# Patient Record
Sex: FEMALE | HISPANIC OR LATINO | URBAN - METROPOLITAN AREA
[De-identification: names, ages, dates, MRNs, and addresses within clinical notes are randomized per-mention and may not be internally consistent; named-entity substitution may affect disease eponyms.]

---

## 2021-03-29 ENCOUNTER — OFFICE VISIT (OUTPATIENT)
Dept: URBAN - METROPOLITAN AREA CLINIC 109 | Facility: CLINIC | Age: 41
End: 2021-03-29
Payer: COMMERCIAL

## 2021-03-29 ENCOUNTER — WEB ENCOUNTER (OUTPATIENT)
Dept: URBAN - METROPOLITAN AREA CLINIC 109 | Facility: CLINIC | Age: 41
End: 2021-03-29

## 2021-03-29 DIAGNOSIS — K80.20 GALLSTONES: ICD-10-CM

## 2021-03-29 DIAGNOSIS — I10 ESSENTIAL HYPERTENSION: ICD-10-CM

## 2021-03-29 PROCEDURE — 99203 OFFICE O/P NEW LOW 30 MIN: CPT | Performed by: INTERNAL MEDICINE

## 2021-03-29 NOTE — HPI-TODAY'S VISIT:
The patient has been referred for  gallstones discovered 4 years ago.   She had a f/u u/s 4 months ago.  She has RUQ discomfort associated with fatty meals with nausea and pain radiating to the back.  Sx have become frequent in recent months.  No weight loss.  No history of PUD.  Denies used of NSAIDs.  No ETOH intake. Repeat u/s in November 2020 revealed numerous gallstones in the gallbladder with no wall thickening or dilated ducts.

## 2022-01-11 ENCOUNTER — WEB ENCOUNTER (OUTPATIENT)
Dept: URBAN - METROPOLITAN AREA CLINIC 109 | Facility: CLINIC | Age: 42
End: 2022-01-11

## 2022-01-11 ENCOUNTER — OFFICE VISIT (OUTPATIENT)
Dept: URBAN - METROPOLITAN AREA CLINIC 109 | Facility: CLINIC | Age: 42
End: 2022-01-11
Payer: COMMERCIAL

## 2022-01-11 ENCOUNTER — DASHBOARD ENCOUNTERS (OUTPATIENT)
Age: 42
End: 2022-01-11

## 2022-01-11 DIAGNOSIS — K80.20 GALLSTONES: ICD-10-CM

## 2022-01-11 DIAGNOSIS — I10 ESSENTIAL HYPERTENSION: ICD-10-CM

## 2022-01-11 PROBLEM — 235919008: Status: ACTIVE | Noted: 2021-03-29

## 2022-01-11 PROBLEM — 59621000: Status: ACTIVE | Noted: 2021-03-29

## 2022-01-11 PROBLEM — 38341003 HYPERTENSION: Status: ACTIVE | Noted: 2022-01-11

## 2022-01-11 PROCEDURE — 99213 OFFICE O/P EST LOW 20 MIN: CPT | Performed by: INTERNAL MEDICINE

## 2022-01-11 NOTE — HPI-TODAY'S VISIT:
The patient returns for evaluation of abdominal pain. On her visit in March 2021 she was having colicky pain and had a history of multiple gallstones  in the GB known for at least 4 years.  She was referred to a surgeon at that time.  She reports never being called by the surgeon's office. LMP is now.

## 2025-05-02 ENCOUNTER — OFFICE VISIT (OUTPATIENT)
Dept: URBAN - METROPOLITAN AREA CLINIC 109 | Facility: CLINIC | Age: 45
End: 2025-05-02

## 2025-05-23 ENCOUNTER — OFFICE VISIT (OUTPATIENT)
Dept: URBAN - METROPOLITAN AREA CLINIC 109 | Facility: CLINIC | Age: 45
End: 2025-05-23

## 2025-06-06 ENCOUNTER — OFFICE VISIT (OUTPATIENT)
Dept: URBAN - METROPOLITAN AREA CLINIC 109 | Facility: CLINIC | Age: 45
End: 2025-06-06
Payer: COMMERCIAL

## 2025-06-06 DIAGNOSIS — R10.32 LLQ ABDOMINAL PAIN: ICD-10-CM

## 2025-06-06 DIAGNOSIS — I10 PRIMARY HYPERTENSION: ICD-10-CM

## 2025-06-06 DIAGNOSIS — Z12.11 ENCOUNTER FOR SCREENING FOR MALIGNANT NEOPLASM OF COLON: ICD-10-CM

## 2025-06-06 PROCEDURE — 99203 OFFICE O/P NEW LOW 30 MIN: CPT | Performed by: INTERNAL MEDICINE

## 2025-06-06 RX ORDER — SODIUM, POTASSIUM,MAG SULFATES 17.5-3.13G
AS DIRECTED SOLUTION, RECONSTITUTED, ORAL ORAL
OUTPATIENT
Start: 2025-06-06

## 2025-06-06 RX ORDER — AMITRIPTYLINE HYDROCHLORIDE 25 MG/1
1 TAB EVERY NIGHT; IF NOT BETTER AND NO SIDE EFFECTS INCREASE TO 2 TABS EVERY NIGHT TABLET, FILM COATED ORAL ONCE A DAY
Qty: 180 | Refills: 2 | OUTPATIENT
Start: 2025-06-06

## 2025-06-06 NOTE — HPI-TODAY'S VISIT:
used [employer] video   Patient here for routine colonoscopy evaluation. The patient denies specific GI complaints with no rectal bleeding, abdominal pain, or change in bowel habits  reports left side pain where one of the trochars was where had CCY